# Patient Record
Sex: FEMALE | Race: WHITE | NOT HISPANIC OR LATINO | URBAN - METROPOLITAN AREA
[De-identification: names, ages, dates, MRNs, and addresses within clinical notes are randomized per-mention and may not be internally consistent; named-entity substitution may affect disease eponyms.]

---

## 2023-08-01 ENCOUNTER — NURSING HOME VISIT (OUTPATIENT)
Dept: POST ACUTE CARE | Facility: EXTERNAL LOCATION | Age: 88
End: 2023-08-01
Payer: MEDICARE

## 2023-08-01 VITALS
RESPIRATION RATE: 18 BRPM | TEMPERATURE: 97.2 F | HEART RATE: 72 BPM | SYSTOLIC BLOOD PRESSURE: 118 MMHG | DIASTOLIC BLOOD PRESSURE: 70 MMHG | OXYGEN SATURATION: 97 %

## 2023-08-01 DIAGNOSIS — S72.141D CLOSED DISPLACED INTERTROCHANTERIC FRACTURE OF RIGHT FEMUR WITH ROUTINE HEALING, SUBSEQUENT ENCOUNTER: Primary | ICD-10-CM

## 2023-08-01 DIAGNOSIS — E03.9 HYPOTHYROIDISM, UNSPECIFIED TYPE: ICD-10-CM

## 2023-08-01 DIAGNOSIS — I10 PRIMARY HYPERTENSION: ICD-10-CM

## 2023-08-01 DIAGNOSIS — D62 ACUTE BLOOD LOSS ANEMIA: ICD-10-CM

## 2023-08-01 DIAGNOSIS — F03.C0 SEVERE DEMENTIA, UNSPECIFIED DEMENTIA TYPE, UNSPECIFIED WHETHER BEHAVIORAL, PSYCHOTIC, OR MOOD DISTURBANCE OR ANXIETY (MULTI): ICD-10-CM

## 2023-08-01 PROBLEM — F03.90 DEMENTIA (MULTI): Status: ACTIVE | Noted: 2023-08-01

## 2023-08-01 PROBLEM — I48.91 ATRIAL FIBRILLATION (MULTI): Status: ACTIVE | Noted: 2023-08-01

## 2023-08-01 PROCEDURE — 99310 SBSQ NF CARE HIGH MDM 45: CPT | Performed by: NURSE PRACTITIONER

## 2023-08-01 ASSESSMENT — ENCOUNTER SYMPTOMS
PALPITATIONS: 0
FATIGUE: 0
COUGH: 0
ABDOMINAL PAIN: 0
FEVER: 0
DIARRHEA: 0
CHILLS: 0
NAUSEA: 0
SHORTNESS OF BREATH: 0
DIFFICULTY URINATING: 0
VOMITING: 0
CONSTIPATION: 0

## 2023-08-01 NOTE — PROGRESS NOTES
Subjective   Myrna Judd is a 89 y.o. female Here following hospitalization due to fall with femur fracture.    HPI  She resides in a secured memory care unit and fell, sustained a right intertrochanteric femur fracture, s/p IM nailing.  She received 1u PRBC post op.   She Is 50% weight bearing as tolerated.  She states pain controlled.  Brother and sister present in room during visit.  Myrna has dementia and is oriented times 1, she did not remember fracturing her hip or that she had surgery.     Past medical hx includes HTN, hypothyroid.     Labs:   7/28  WBC: 9.3  Hgb: 7.8  Hct: 24.7  Platelet: 213    Na: 144   K: 3.6  Cl: 109  Co2: 26  BUN: 19  Creatinine: 0.53  GFR:  86    MEDS:  Tylenol routine  Colace  Enoxaparin through 8/23  Lasix  Iron  Levothyroxine  MVI  Sertraline  Vitamin B12  Vitamin D      Review of Systems   Constitutional:  Negative for chills, fatigue and fever.   Respiratory:  Negative for cough and shortness of breath.    Cardiovascular:  Negative for chest pain and palpitations.   Gastrointestinal:  Negative for abdominal pain, constipation, diarrhea, nausea and vomiting.   Genitourinary:  Negative for difficulty urinating.       Objective   /70   Pulse 72   Temp 36.2 °C (97.2 °F)   Resp 18   SpO2 97%     Physical Exam  Constitutional:       General: She is not in acute distress.     Appearance: Normal appearance.   HENT:      Head: Normocephalic and atraumatic.   Eyes:      Conjunctiva/sclera: Conjunctivae normal.   Cardiovascular:      Rate and Rhythm: Normal rate and regular rhythm.   Pulmonary:      Effort: Pulmonary effort is normal. No respiratory distress.      Breath sounds: Normal breath sounds.   Abdominal:      General: Bowel sounds are normal. There is no distension.      Palpations: Abdomen is soft.      Tenderness: There is no abdominal tenderness.   Musculoskeletal:      Right lower leg: No edema.      Left lower leg: No edema.      Comments: Right hip incision,  able to visualize distal incision, SOHA, staples intact,  Per staff she also has a proximal incision unable to visualize as she is sitting up in chair.    Skin:     General: Skin is warm and dry.   Neurological:      General: No focal deficit present.      Mental Status: She is alert.      Comments: Oriented times 1   Psychiatric:         Mood and Affect: Mood normal.         Behavior: Behavior normal.         Assessment/Plan   Problem List Items Addressed This Visit       Closed intertrochanteric fracture of right femur with routine healing - Primary     S/p IM nailing 7/25.  She is 50% weight bearing as tolerated, due to her cognitive deficts she likely can not maintain weight bearing restrictions with attempts to ambulate.    continue with therapy as able.  Follow up with orthopedics as scheduled.           Acute blood loss anemia     Required 1u prbc post op and started on ferrous sulfate           Dementia (CMS/HCC)     She is oriented to person.           Hypothyroid     On levothyroxine, continue with current medical management           Primary hypertension     Nit currently on meds, continue to monitor and initiate meds based on clinical course.         labs/meds/orders reviewed  staff to monitor and notify for any changes.  Hospital records reviewed.  continue with therapy as able, 50% weight bearing to RLE  Follow up with orthopedics as scheduled.  Time for coordination of care was greater than 35 minutes with hospital chart review, visit and exam, discussion of treatment plan with patient and also discussion of case with staff.

## 2023-08-01 NOTE — LETTER
Patient: Myrna Judd  : 1929    Encounter Date: 2023    Subjective  Myrna Judd is a 89 y.o. female Here following hospitalization due to fall with femur fracture.    HPI  She resides in a secured memory care unit and fell, sustained a right intertrochanteric femur fracture, s/p IM nailing.  She received 1u PRBC post op.   She Is 50% weight bearing as tolerated.  She states pain controlled.  Brother and sister present in room during visit.  Myrna has dementia and is oriented times 1, she did not remember fracturing her hip or that she had surgery.     Past medical hx includes HTN, hypothyroid.     Labs:     WBC: 9.3  Hgb: 7.8  Hct: 24.7  Platelet: 213    Na: 144   K: 3.6  Cl: 109  Co2: 26  BUN: 19  Creatinine: 0.53  GFR:  86    MEDS:  Tylenol routine  Colace  Enoxaparin through   Lasix  Iron  Levothyroxine  MVI  Sertraline  Vitamin B12  Vitamin D      Review of Systems   Constitutional:  Negative for chills, fatigue and fever.   Respiratory:  Negative for cough and shortness of breath.    Cardiovascular:  Negative for chest pain and palpitations.   Gastrointestinal:  Negative for abdominal pain, constipation, diarrhea, nausea and vomiting.   Genitourinary:  Negative for difficulty urinating.       Objective  /70   Pulse 72   Temp 36.2 °C (97.2 °F)   Resp 18   SpO2 97%     Physical Exam  Constitutional:       General: She is not in acute distress.     Appearance: Normal appearance.   HENT:      Head: Normocephalic and atraumatic.   Eyes:      Conjunctiva/sclera: Conjunctivae normal.   Cardiovascular:      Rate and Rhythm: Normal rate and regular rhythm.   Pulmonary:      Effort: Pulmonary effort is normal. No respiratory distress.      Breath sounds: Normal breath sounds.   Abdominal:      General: Bowel sounds are normal. There is no distension.      Palpations: Abdomen is soft.      Tenderness: There is no abdominal tenderness.   Musculoskeletal:      Right lower leg: No  edema.      Left lower leg: No edema.      Comments: Right hip incision, able to visualize distal incision, SOHA, staples intact,  Per staff she also has a proximal incision unable to visualize as she is sitting up in chair.    Skin:     General: Skin is warm and dry.   Neurological:      General: No focal deficit present.      Mental Status: She is alert.      Comments: Oriented times 1   Psychiatric:         Mood and Affect: Mood normal.         Behavior: Behavior normal.         Assessment/Plan  Problem List Items Addressed This Visit       Closed intertrochanteric fracture of right femur with routine healing - Primary     S/p IM nailing 7/25.  She is 50% weight bearing as tolerated, due to her cognitive deficts she likely can not maintain weight bearing restrictions with attempts to ambulate.    continue with therapy as able.  Follow up with orthopedics as scheduled.           Acute blood loss anemia     Required 1u prbc post op and started on ferrous sulfate           Dementia (CMS/HCC)     She is oriented to person.           Hypothyroid     On levothyroxine, continue with current medical management           Primary hypertension     Nit currently on meds, continue to monitor and initiate meds based on clinical course.         labs/meds/orders reviewed  staff to monitor and notify for any changes.  Hospital records reviewed.  continue with therapy as able, 50% weight bearing to RLE  Follow up with orthopedics as scheduled.  Time for coordination of care was greater than 35 minutes with hospital chart review, visit and exam, discussion of treatment plan with patient and also discussion of case with staff.      Electronically Signed By: GO Thomason   8/1/23  4:12 PM

## 2023-08-01 NOTE — ASSESSMENT & PLAN NOTE
S/p IM nailing 7/25.  She is 50% weight bearing as tolerated, due to her cognitive deficts she likely can not maintain weight bearing restrictions with attempts to ambulate.    continue with therapy as able.  Follow up with orthopedics as scheduled.

## 2023-08-04 ENCOUNTER — NURSING HOME VISIT (OUTPATIENT)
Dept: POST ACUTE CARE | Facility: EXTERNAL LOCATION | Age: 88
End: 2023-08-04
Payer: MEDICARE

## 2023-08-04 VITALS
SYSTOLIC BLOOD PRESSURE: 124 MMHG | OXYGEN SATURATION: 97 % | RESPIRATION RATE: 18 BRPM | DIASTOLIC BLOOD PRESSURE: 76 MMHG | TEMPERATURE: 98.1 F | HEART RATE: 78 BPM

## 2023-08-04 DIAGNOSIS — I10 PRIMARY HYPERTENSION: ICD-10-CM

## 2023-08-04 DIAGNOSIS — I48.91 ATRIAL FIBRILLATION, UNSPECIFIED TYPE (MULTI): ICD-10-CM

## 2023-08-04 DIAGNOSIS — D62 ACUTE BLOOD LOSS ANEMIA: ICD-10-CM

## 2023-08-04 DIAGNOSIS — S72.141D CLOSED DISPLACED INTERTROCHANTERIC FRACTURE OF RIGHT FEMUR WITH ROUTINE HEALING, SUBSEQUENT ENCOUNTER: Primary | ICD-10-CM

## 2023-08-04 DIAGNOSIS — F03.C0 SEVERE DEMENTIA, UNSPECIFIED DEMENTIA TYPE, UNSPECIFIED WHETHER BEHAVIORAL, PSYCHOTIC, OR MOOD DISTURBANCE OR ANXIETY (MULTI): ICD-10-CM

## 2023-08-04 PROCEDURE — 99306 1ST NF CARE HIGH MDM 50: CPT | Performed by: INTERNAL MEDICINE

## 2023-08-04 NOTE — PROGRESS NOTES
"Subjective   Patient ID: Myrna Judd is a 89 y.o. female who is acute skilled care being seen and evaluated for multiple medical problems.    HPI   Myrna Judd is a 89 y.o. female Here following hospitalization due to fall with femur fracture.    HPI  She resides in a secured memory care unit and fell, sustained a right intertrochanteric femur fracture, s/p IM nailing.  She received 1u PRBC post op.   She Is 50% weight bearing as tolerated.  She states pain controlled.  Brother and sister present in room during visit.  Myrna has dementia and is oriented times 1, she did not remember fracturing her hip or that she had surgery.     Past medical hx includes HTN, hypothyroid.    The patient is resting in her wheelchair with family present.  She is in acute pain and reports that the Tylenol that she is receiving is inadequate for pain management.  Family reports that the patient became \"loopy\" when taking opiates in the hospital.  Per the family the patient has likely not had exposure to the opiate agonists medication  tramadol.     Labs:   7/28  WBC: 9.3  Hgb: 7.8  Hct: 24.7  Platelet: 213     Na: 144   K: 3.6  Cl: 109  Co2: 26  BUN: 19  Creatinine: 0.53  GFR:  86     MEDS:  Tylenol routine  Colace  Enoxaparin through 8/23  Lasix  Iron  Levothyroxine  MVI  Sertraline  Vitamin B12  Vitamin D     Review of Systems   Constitutional:  Negative for chills, fatigue and fever.   Respiratory:  Negative for cough and shortness of breath.    Cardiovascular:  Negative for chest pain and palpitations.   Gastrointestinal:  Negative for abdominal pain, constipation, diarrhea, nausea and vomiting.   Genitourinary:  Negative for difficulty urinating.       Objective   /76   Pulse 78   Temp 36.7 °C (98.1 °F)   Resp 18   SpO2 97%     Physical Exam  Constitutional:       General: She is not in acute distress.     Appearance: Normal appearance.   HENT:      Head: Normocephalic and atraumatic.      Mouth/Throat:      " Mouth: Mucous membranes are moist.   Eyes:      Conjunctiva/sclera: Conjunctivae normal.   Cardiovascular:      Rate and Rhythm: Normal rate and regular rhythm.   Pulmonary:      Effort: Pulmonary effort is normal. No respiratory distress.      Breath sounds: Normal breath sounds.   Abdominal:      General: Bowel sounds are normal. There is no distension.      Palpations: Abdomen is soft.      Tenderness: There is no abdominal tenderness.   Musculoskeletal:      Right lower leg: No edema.      Left lower leg: No edema.      Comments: Right hip incision, able to visualize distal incision, SOHA, staples intact,  Per staff she also has a proximal incision unable to visualize as she is sitting up in chair.    Skin:     General: Skin is warm and dry.   Neurological:      General: No focal deficit present.      Mental Status: She is alert.      Comments: Oriented times 1   Psychiatric:         Mood and Affect: Mood normal.         Behavior: Behavior normal.         Assessment/Plan   Problem List Items Addressed This Visit       Atrial fibrillation (CMS/HCC)    Closed intertrochanteric fracture of right femur with routine healing - Primary    Acute blood loss anemia    Dementia (CMS/HCC)    Primary hypertension       A.  We will continue with rehabilitative restorative and supportive care as patient tolerates    B.  We will begin the patient on low-dose as needed tramadol for analgesic therapy in hopes that she will be able to participate in physical therapy to a better degree without so much discomfort.    C.  The patient left close outpatient follow-up with her orthopedic surgeon to monitor healing of the fixated femur fracture    D.  Laboratory examinations will be drawn on an ongoing as-needed basis    E.  Disposition will be pending response to rehabilitation overall assessment of patient safety awareness however the patient may at some point be ready for discharge back to the memory care unit at an assisted living  facility from which she came.    F.  The patient's prognosis is guarded.

## 2023-08-04 NOTE — LETTER
"Patient: Myrna Judd  : 1929    Encounter Date: 2023    Subjective  Patient ID: Myrna Judd is a 89 y.o. female who is acute skilled care being seen and evaluated for multiple medical problems.    HPI   Myrna Judd is a 89 y.o. female Here following hospitalization due to fall with femur fracture.    HPI  She resides in a secured memory care unit and fell, sustained a right intertrochanteric femur fracture, s/p IM nailing.  She received 1u PRBC post op.   She Is 50% weight bearing as tolerated.  She states pain controlled.  Brother and sister present in room during visit.  Myrna has dementia and is oriented times 1, she did not remember fracturing her hip or that she had surgery.     Past medical hx includes HTN, hypothyroid.    The patient is resting in her wheelchair with family present.  She is in acute pain and reports that the Tylenol that she is receiving is inadequate for pain management.  Family reports that the patient became \"loopy\" when taking opiates in the hospital.  Per the family the patient has likely not had exposure to the opiate agonists medication  tramadol.     Labs:     WBC: 9.3  Hgb: 7.8  Hct: 24.7  Platelet: 213     Na: 144   K: 3.6  Cl: 109  Co2: 26  BUN: 19  Creatinine: 0.53  GFR:  86     MEDS:  Tylenol routine  Colace  Enoxaparin through   Lasix  Iron  Levothyroxine  MVI  Sertraline  Vitamin B12  Vitamin D     Review of Systems   Constitutional:  Negative for chills, fatigue and fever.   Respiratory:  Negative for cough and shortness of breath.    Cardiovascular:  Negative for chest pain and palpitations.   Gastrointestinal:  Negative for abdominal pain, constipation, diarrhea, nausea and vomiting.   Genitourinary:  Negative for difficulty urinating.       Objective  /76   Pulse 78   Temp 36.7 °C (98.1 °F)   Resp 18   SpO2 97%     Physical Exam  Constitutional:       General: She is not in acute distress.     Appearance: Normal appearance.   HENT: "      Head: Normocephalic and atraumatic.      Mouth/Throat:      Mouth: Mucous membranes are moist.   Eyes:      Conjunctiva/sclera: Conjunctivae normal.   Cardiovascular:      Rate and Rhythm: Normal rate and regular rhythm.   Pulmonary:      Effort: Pulmonary effort is normal. No respiratory distress.      Breath sounds: Normal breath sounds.   Abdominal:      General: Bowel sounds are normal. There is no distension.      Palpations: Abdomen is soft.      Tenderness: There is no abdominal tenderness.   Musculoskeletal:      Right lower leg: No edema.      Left lower leg: No edema.      Comments: Right hip incision, able to visualize distal incision, SOHA, staples intact,  Per staff she also has a proximal incision unable to visualize as she is sitting up in chair.    Skin:     General: Skin is warm and dry.   Neurological:      General: No focal deficit present.      Mental Status: She is alert.      Comments: Oriented times 1   Psychiatric:         Mood and Affect: Mood normal.         Behavior: Behavior normal.         Assessment/Plan  Problem List Items Addressed This Visit       Atrial fibrillation (CMS/HCC)    Closed intertrochanteric fracture of right femur with routine healing - Primary    Acute blood loss anemia    Dementia (CMS/formerly [facility])    Primary hypertension       A.  We will continue with rehabilitative restorative and supportive care as patient tolerates    B.  We will begin the patient on low-dose as needed tramadol for analgesic therapy in hopes that she will be able to participate in physical therapy to a better degree without so much discomfort.    C.  The patient left close outpatient follow-up with her orthopedic surgeon to monitor healing of the fixated femur fracture    D.  Laboratory examinations will be drawn on an ongoing as-needed basis    E.  Disposition will be pending response to rehabilitation overall assessment of patient safety awareness however the patient may at some point be ready for  discharge back to the memory care unit at an assisted living facility from which she came.    F.  The patient's prognosis is guarded.      Electronically Signed By: Michael Luz MD   8/8/23 10:19 PM

## 2023-08-07 ENCOUNTER — NURSING HOME VISIT (OUTPATIENT)
Dept: POST ACUTE CARE | Facility: EXTERNAL LOCATION | Age: 88
End: 2023-08-07
Payer: MEDICARE

## 2023-08-07 DIAGNOSIS — I48.91 ATRIAL FIBRILLATION, UNSPECIFIED TYPE (MULTI): ICD-10-CM

## 2023-08-07 DIAGNOSIS — D62 ACUTE BLOOD LOSS ANEMIA: ICD-10-CM

## 2023-08-07 DIAGNOSIS — S72.141D CLOSED DISPLACED INTERTROCHANTERIC FRACTURE OF RIGHT FEMUR WITH ROUTINE HEALING, SUBSEQUENT ENCOUNTER: Primary | ICD-10-CM

## 2023-08-07 DIAGNOSIS — I10 PRIMARY HYPERTENSION: ICD-10-CM

## 2023-08-07 DIAGNOSIS — F03.C0 SEVERE DEMENTIA, UNSPECIFIED DEMENTIA TYPE, UNSPECIFIED WHETHER BEHAVIORAL, PSYCHOTIC, OR MOOD DISTURBANCE OR ANXIETY (MULTI): ICD-10-CM

## 2023-08-07 PROCEDURE — 99309 SBSQ NF CARE MODERATE MDM 30: CPT | Performed by: INTERNAL MEDICINE

## 2023-08-07 NOTE — LETTER
Patient: Myrna Judd  : 1929    Encounter Date: 2023    Subjective  Patient ID: Myrna Judd is a 89 y.o. female who is acute skilled care being seen and evaluated for multiple medical problems.    HPI   This 89-year-old female patient reports that the pain medication she is taking seems to help.  Daughter is visiting this afternoon.  She has had a recent right hip open reduction internal fixation.  The patient is having some urinary symptoms and therefore we are waiting the results of a urine analysis to look for urinary tract infection.  Per nursing the patient is having poor oral intake and therefore there is concern about her ability to keep yourself hydrated.    Current Ravi medication:  Lovenox  Iron  Tramadol    Laboratory examinations from hospitalization have been reviewed.        Review of Systems   Constitutional:  Negative for chills, fatigue and fever.   Respiratory:  Negative for cough and shortness of breath.    Cardiovascular:  Negative for chest pain and palpitations.   Gastrointestinal:  Negative for abdominal pain, constipation, diarrhea, nausea and vomiting.   Genitourinary:  Negative for difficulty urinating.       Objective  /60   Pulse 78   Temp 36.4 °C (97.5 °F)   Resp 18   SpO2 97%     Physical Exam  Constitutional:       General: She is not in acute distress.     Appearance: Normal appearance.   HENT:      Head: Normocephalic and atraumatic.      Mouth/Throat:      Mouth: Mucous membranes are moist.   Eyes:      Conjunctiva/sclera: Conjunctivae normal.   Cardiovascular:      Rate and Rhythm: Normal rate and regular rhythm.   Pulmonary:      Effort: Pulmonary effort is normal. No respiratory distress.      Breath sounds: Normal breath sounds.   Abdominal:      General: Bowel sounds are normal. There is no distension.      Palpations: Abdomen is soft.      Tenderness: There is no abdominal tenderness.   Musculoskeletal:      Right lower leg: No edema.      Left  lower leg: No edema.      Comments: Right hip incision, able to visualize distal incision, SOHA, staples intact,  Per staff she also has a proximal incision unable to visualize as she is sitting up in chair.    Skin:     General: Skin is warm and dry.   Neurological:      General: No focal deficit present.      Mental Status: She is alert.      Comments: Oriented times 1   Psychiatric:         Mood and Affect: Mood normal.         Behavior: Behavior normal.         Assessment/Plan  Problem List Items Addressed This Visit       Atrial fibrillation (CMS/formerly [facility])    Closed intertrochanteric fracture of right femur with routine healing - Primary    Acute blood loss anemia    Dementia (CMS/formerly [facility])    Primary hypertension     8.  We will continue with rehabilitative restorative and supportive care as patient tolerates    B.  Will await urinalysis for her sample and treat urinary tract infection if it is there.    C.  Of asked for calorie and fluid counts to monitor whether the patient will be able to maintain her self    D.  Continue with tramadol as needed for pain as she appears to tolerate it fairly well    E.  The patient's prognosis is guarded.        Electronically Signed By: Michael Luz MD   8/20/23  7:43 PM

## 2023-08-08 VITALS
DIASTOLIC BLOOD PRESSURE: 60 MMHG | RESPIRATION RATE: 18 BRPM | OXYGEN SATURATION: 97 % | SYSTOLIC BLOOD PRESSURE: 136 MMHG | HEART RATE: 78 BPM | TEMPERATURE: 97.5 F

## 2023-08-08 ASSESSMENT — ENCOUNTER SYMPTOMS
NAUSEA: 0
DIARRHEA: 0
DIFFICULTY URINATING: 0
PALPITATIONS: 0
VOMITING: 0
CONSTIPATION: 0
ABDOMINAL PAIN: 0
FATIGUE: 0
COUGH: 0
FEVER: 0
CHILLS: 0
SHORTNESS OF BREATH: 0

## 2023-08-08 NOTE — PROGRESS NOTES
Subjective   Patient ID: Myrna Judd is a 89 y.o. female who is acute skilled care being seen and evaluated for multiple medical problems.    HPI   This 89-year-old female patient reports that the pain medication she is taking seems to help.  Daughter is visiting this afternoon.  She has had a recent right hip open reduction internal fixation.  The patient is having some urinary symptoms and therefore we are waiting the results of a urine analysis to look for urinary tract infection.  Per nursing the patient is having poor oral intake and therefore there is concern about her ability to keep yourself hydrated.    Current Ravi medication:  Lovenox  Iron  Tramadol    Laboratory examinations from hospitalization have been reviewed.        Review of Systems   Constitutional:  Negative for chills, fatigue and fever.   Respiratory:  Negative for cough and shortness of breath.    Cardiovascular:  Negative for chest pain and palpitations.   Gastrointestinal:  Negative for abdominal pain, constipation, diarrhea, nausea and vomiting.   Genitourinary:  Negative for difficulty urinating.       Objective   /60   Pulse 78   Temp 36.4 °C (97.5 °F)   Resp 18   SpO2 97%     Physical Exam  Constitutional:       General: She is not in acute distress.     Appearance: Normal appearance.   HENT:      Head: Normocephalic and atraumatic.      Mouth/Throat:      Mouth: Mucous membranes are moist.   Eyes:      Conjunctiva/sclera: Conjunctivae normal.   Cardiovascular:      Rate and Rhythm: Normal rate and regular rhythm.   Pulmonary:      Effort: Pulmonary effort is normal. No respiratory distress.      Breath sounds: Normal breath sounds.   Abdominal:      General: Bowel sounds are normal. There is no distension.      Palpations: Abdomen is soft.      Tenderness: There is no abdominal tenderness.   Musculoskeletal:      Right lower leg: No edema.      Left lower leg: No edema.      Comments: Right hip incision, able to  visualize distal incision, SOHA, staples intact,  Per staff she also has a proximal incision unable to visualize as she is sitting up in chair.    Skin:     General: Skin is warm and dry.   Neurological:      General: No focal deficit present.      Mental Status: She is alert.      Comments: Oriented times 1   Psychiatric:         Mood and Affect: Mood normal.         Behavior: Behavior normal.         Assessment/Plan   Problem List Items Addressed This Visit       Atrial fibrillation (CMS/HCC)    Closed intertrochanteric fracture of right femur with routine healing - Primary    Acute blood loss anemia    Dementia (CMS/[facility])    Primary hypertension     8.  We will continue with rehabilitative restorative and supportive care as patient tolerates    B.  Will await urinalysis for her sample and treat urinary tract infection if it is there.    C.  Of asked for calorie and fluid counts to monitor whether the patient will be able to maintain her self    D.  Continue with tramadol as needed for pain as she appears to tolerate it fairly well    E.  The patient's prognosis is guarded.

## 2023-08-10 ENCOUNTER — NURSING HOME VISIT (OUTPATIENT)
Dept: POST ACUTE CARE | Facility: EXTERNAL LOCATION | Age: 88
End: 2023-08-10
Payer: MEDICARE

## 2023-08-10 VITALS
TEMPERATURE: 98.1 F | DIASTOLIC BLOOD PRESSURE: 74 MMHG | RESPIRATION RATE: 18 BRPM | HEART RATE: 89 BPM | OXYGEN SATURATION: 97 % | SYSTOLIC BLOOD PRESSURE: 122 MMHG

## 2023-08-10 DIAGNOSIS — I48.91 ATRIAL FIBRILLATION, UNSPECIFIED TYPE (MULTI): ICD-10-CM

## 2023-08-10 DIAGNOSIS — S72.141D CLOSED DISPLACED INTERTROCHANTERIC FRACTURE OF RIGHT FEMUR WITH ROUTINE HEALING, SUBSEQUENT ENCOUNTER: Primary | ICD-10-CM

## 2023-08-10 DIAGNOSIS — F03.C0 SEVERE DEMENTIA, UNSPECIFIED DEMENTIA TYPE, UNSPECIFIED WHETHER BEHAVIORAL, PSYCHOTIC, OR MOOD DISTURBANCE OR ANXIETY (MULTI): ICD-10-CM

## 2023-08-10 DIAGNOSIS — D62 ACUTE BLOOD LOSS ANEMIA: ICD-10-CM

## 2023-08-10 DIAGNOSIS — I10 PRIMARY HYPERTENSION: ICD-10-CM

## 2023-08-10 PROCEDURE — 99309 SBSQ NF CARE MODERATE MDM 30: CPT | Performed by: NURSE PRACTITIONER

## 2023-08-10 ASSESSMENT — ENCOUNTER SYMPTOMS
CONSTIPATION: 0
FEVER: 0
DIFFICULTY URINATING: 0
DIARRHEA: 0
VOMITING: 0
PALPITATIONS: 0
CHILLS: 0
ABDOMINAL PAIN: 0
FATIGUE: 0
SHORTNESS OF BREATH: 0
COUGH: 0
NAUSEA: 0

## 2023-08-10 NOTE — LETTER
Patient: Myrna Judd  : 1929    Encounter Date: 08/10/2023    Subjective  Myrna Judd is a 89 y.o. female Here for weekly skilled visit.   HPI    Health problems reviewed.  She was started on augmentin yesterday due to uti.  Participating in therapy as able, limited by weiight bearing restructions and ognitive deficits.   Eating and drinking fair per staff, calorie and fluid count was ordered, no results available.   Denies any complaints of pain.  Daughter present during visit.  No concerns per staff.        Review of Systems   Constitutional:  Negative for chills, fatigue and fever.   Respiratory:  Negative for cough and shortness of breath.    Cardiovascular:  Negative for chest pain and palpitations.   Gastrointestinal:  Negative for abdominal pain, constipation, diarrhea, nausea and vomiting.   Genitourinary:  Negative for difficulty urinating.       Objective  /74   Pulse 89   Temp 36.7 °C (98.1 °F)   Resp 18   SpO2 97%     Physical Exam  Constitutional:       General: She is not in acute distress.     Appearance: Normal appearance.   HENT:      Head: Normocephalic and atraumatic.   Eyes:      Conjunctiva/sclera: Conjunctivae normal.   Cardiovascular:      Rate and Rhythm: Normal rate and regular rhythm.   Pulmonary:      Effort: Pulmonary effort is normal. No respiratory distress.      Breath sounds: Normal breath sounds.   Abdominal:      General: Bowel sounds are normal. There is no distension.      Palpations: Abdomen is soft.      Tenderness: There is no abdominal tenderness.   Genitourinary:     Comments: Limon draining clear yellow  Musculoskeletal:      Right lower leg: No edema.      Left lower leg: No edema.      Comments: Right hip incision, able to visualize distal incision, SOHA, staples intact,  Per staff she also has a proximal incision unable to visualize as she is sitting up in chair.    Skin:     General: Skin is warm and dry.   Neurological:      General: No focal  deficit present.      Mental Status: She is alert.      Comments: Oriented times 1   Psychiatric:         Mood and Affect: Mood normal.         Behavior: Behavior normal.         Assessment/Plan  Problem List Items Addressed This Visit       Atrial fibrillation (CMS/HCC)     Not on ac.  Rate controlled.           Closed intertrochanteric fracture of right femur with routine healing - Primary     S/p IM nailing 7/25.  She is 50% weight bearing as tolerated, due to her cognitive deficts she likely can not maintain weight bearing restrictions with attempts to ambulate.    continue with therapy as able.  Follow up with orthopedics as scheduled.           Acute blood loss anemia     Required 1u prbc post op and started on ferrous sulfate.  Repeat hgb 9.9.             Dementia (CMS/HCC)     She is oriented to person.           Primary hypertension     Not currently on meds, continue to monitor and initiate meds based on clinical course.         labs/meds/orders reviewed  staff to monitor and notify for any changes.  continue with therapy as able, 50% weight bearing to RLE  Follow up with orthopedics as scheduled.  Started on augmentin last night due to uti.  Limon to be removed in am, reinsert if no void in 8 hours.       Electronically Signed By: ZACHERY Thomason-JEANE   8/10/23  3:34 PM

## 2023-08-10 NOTE — PROGRESS NOTES
Subjective   Myrna Judd is a 89 y.o. female Here for weekly skilled visit.   HPI    Health problems reviewed.  She was started on augmentin yesterday due to uti.  Participating in therapy as able, limited by weiight bearing restructions and ognitive deficits.   Eating and drinking fair per staff, calorie and fluid count was ordered, no results available.   Denies any complaints of pain.  Daughter present during visit.  No concerns per staff.        Review of Systems   Constitutional:  Negative for chills, fatigue and fever.   Respiratory:  Negative for cough and shortness of breath.    Cardiovascular:  Negative for chest pain and palpitations.   Gastrointestinal:  Negative for abdominal pain, constipation, diarrhea, nausea and vomiting.   Genitourinary:  Negative for difficulty urinating.       Objective   /74   Pulse 89   Temp 36.7 °C (98.1 °F)   Resp 18   SpO2 97%     Physical Exam  Constitutional:       General: She is not in acute distress.     Appearance: Normal appearance.   HENT:      Head: Normocephalic and atraumatic.   Eyes:      Conjunctiva/sclera: Conjunctivae normal.   Cardiovascular:      Rate and Rhythm: Normal rate and regular rhythm.   Pulmonary:      Effort: Pulmonary effort is normal. No respiratory distress.      Breath sounds: Normal breath sounds.   Abdominal:      General: Bowel sounds are normal. There is no distension.      Palpations: Abdomen is soft.      Tenderness: There is no abdominal tenderness.   Genitourinary:     Comments: Limon draining clear yellow  Musculoskeletal:      Right lower leg: No edema.      Left lower leg: No edema.      Comments: Right hip incision, able to visualize distal incision, SOHA, staples intact,  Per staff she also has a proximal incision unable to visualize as she is sitting up in chair.    Skin:     General: Skin is warm and dry.   Neurological:      General: No focal deficit present.      Mental Status: She is alert.      Comments: Oriented  times 1   Psychiatric:         Mood and Affect: Mood normal.         Behavior: Behavior normal.         Assessment/Plan   Problem List Items Addressed This Visit       Atrial fibrillation (CMS/HCC)     Not on ac.  Rate controlled.           Closed intertrochanteric fracture of right femur with routine healing - Primary     S/p IM nailing 7/25.  She is 50% weight bearing as tolerated, due to her cognitive deficts she likely can not maintain weight bearing restrictions with attempts to ambulate.    continue with therapy as able.  Follow up with orthopedics as scheduled.           Acute blood loss anemia     Required 1u prbc post op and started on ferrous sulfate.  Repeat hgb 9.9.             Dementia (CMS/HCC)     She is oriented to person.           Primary hypertension     Not currently on meds, continue to monitor and initiate meds based on clinical course.         labs/meds/orders reviewed  staff to monitor and notify for any changes.  continue with therapy as able, 50% weight bearing to RLE  Follow up with orthopedics as scheduled.  Started on augmentin last night due to uti.  Ilmon to be removed in am, reinsert if no void in 8 hours.

## 2023-08-15 ENCOUNTER — NURSING HOME VISIT (OUTPATIENT)
Dept: POST ACUTE CARE | Facility: EXTERNAL LOCATION | Age: 88
End: 2023-08-15
Payer: MEDICARE

## 2023-08-15 VITALS
DIASTOLIC BLOOD PRESSURE: 76 MMHG | SYSTOLIC BLOOD PRESSURE: 123 MMHG | TEMPERATURE: 99 F | RESPIRATION RATE: 18 BRPM | HEART RATE: 99 BPM | OXYGEN SATURATION: 98 %

## 2023-08-15 DIAGNOSIS — F03.C0 SEVERE DEMENTIA, UNSPECIFIED DEMENTIA TYPE, UNSPECIFIED WHETHER BEHAVIORAL, PSYCHOTIC, OR MOOD DISTURBANCE OR ANXIETY (MULTI): ICD-10-CM

## 2023-08-15 DIAGNOSIS — I10 PRIMARY HYPERTENSION: ICD-10-CM

## 2023-08-15 DIAGNOSIS — S72.141D CLOSED DISPLACED INTERTROCHANTERIC FRACTURE OF RIGHT FEMUR WITH ROUTINE HEALING, SUBSEQUENT ENCOUNTER: Primary | ICD-10-CM

## 2023-08-15 DIAGNOSIS — N39.0 URINARY TRACT INFECTION ASSOCIATED WITH INDWELLING URETHRAL CATHETER, SUBSEQUENT ENCOUNTER: ICD-10-CM

## 2023-08-15 DIAGNOSIS — I48.91 ATRIAL FIBRILLATION, UNSPECIFIED TYPE (MULTI): ICD-10-CM

## 2023-08-15 DIAGNOSIS — T83.511D URINARY TRACT INFECTION ASSOCIATED WITH INDWELLING URETHRAL CATHETER, SUBSEQUENT ENCOUNTER: ICD-10-CM

## 2023-08-15 PROBLEM — T83.511A URINARY TRACT INFECTION ASSOCIATED WITH INDWELLING URETHRAL CATHETER (CMS-HCC): Status: ACTIVE | Noted: 2023-08-15

## 2023-08-15 PROCEDURE — 99309 SBSQ NF CARE MODERATE MDM 30: CPT | Performed by: NURSE PRACTITIONER

## 2023-08-15 ASSESSMENT — ENCOUNTER SYMPTOMS
DIFFICULTY URINATING: 0
FEVER: 0
ABDOMINAL PAIN: 0
CHILLS: 0
NAUSEA: 0
VOMITING: 0
SHORTNESS OF BREATH: 0
FATIGUE: 0
DIARRHEA: 0
COUGH: 0
CONSTIPATION: 0
PALPITATIONS: 0

## 2023-08-15 NOTE — PROGRESS NOTES
Subjective   Myrna Judd is a 89 y.o. female Here for weekly skilled visit.   HPI    Health problems reviewed.    Participating in therapy as able, limited by weiight bearing restructions and cognitive deficits.   Eating and drinking fair per staff, calorie and fluid count was ordered, no results available.   Denies any complaints of pain.  She completed course of augmentin for klebsiella uti.  Corbett was ordered to be removed 8/11 and reinsert if no void in 8 hours.  It is unclear at this time if this completed, corbett is intact at present, no documentation to support if this was removed and resinserted.   No concerns per staff.        Review of Systems   Constitutional:  Negative for chills, fatigue and fever.   Respiratory:  Negative for cough and shortness of breath.    Cardiovascular:  Negative for chest pain and palpitations.   Gastrointestinal:  Negative for abdominal pain, constipation, diarrhea, nausea and vomiting.   Genitourinary:  Negative for difficulty urinating.       Objective   /76   Pulse 99   Temp 37.2 °C (99 °F)   Resp 18   SpO2 98%     Physical Exam  Constitutional:       General: She is not in acute distress.     Appearance: Normal appearance.   HENT:      Head: Normocephalic and atraumatic.   Eyes:      Conjunctiva/sclera: Conjunctivae normal.   Cardiovascular:      Rate and Rhythm: Normal rate and regular rhythm.   Pulmonary:      Effort: Pulmonary effort is normal. No respiratory distress.      Breath sounds: Normal breath sounds.   Abdominal:      General: Bowel sounds are normal. There is no distension.      Palpations: Abdomen is soft.      Tenderness: There is no abdominal tenderness.   Genitourinary:     Comments: Corbett draining clear yellow  Musculoskeletal:      Right lower leg: No edema.      Left lower leg: No edema.      Comments: Right hip incision, SOHA, intact.    Skin:     General: Skin is warm and dry.   Neurological:      General: No focal deficit present.       Mental Status: She is alert.      Comments: Oriented times 1   Psychiatric:         Mood and Affect: Mood normal.         Behavior: Behavior normal.         Assessment/Plan   Problem List Items Addressed This Visit       Atrial fibrillation (CMS/HCC)     Not normally on ac,currently on lovenox post op at dvt ppx dose.   Rate controlled.           Closed intertrochanteric fracture of right femur with routine healing - Primary     S/p IM nailing 7/25.  She is 50% weight bearing as tolerated, due to her cognitive deficts she likely can not maintain weight bearing restrictions with attempts to ambulate.    continue with therapy as able.  Follow up with orthopedics as scheduled.           Dementia (CMS/HCC)     She is oriented to person.           Primary hypertension     Not currently on meds, continue to monitor and initiate meds based on clinical course.          Urinary tract infection associated with indwelling urethral catheter (CMS/[facility])     Klebsiella.  She completed course of augmentin which sensitivity supports use.  Corbett was ordered to be discontinued 8/11 and reinserted if no void in 8 hours.  Corbett is currently in place, unclear if voiding trial was completed as no documentation available.  Will have staff clarify if corbett was removed and she had retention.          labs/meds/orders reviewed  staff to monitor and notify for any changes.  continue with therapy as able, 50% weight bearing to RLE  Follow up with orthopedics as scheduled.  Will have staff clarify if she  failed voiding trial or if corbett was not removed as ordered.   She is weak and frail.    Ss for discharge planning.

## 2023-08-15 NOTE — LETTER
Patient: Myrna Judd  : 1929    Encounter Date: 08/15/2023    Subjective  Myrna Judd is a 89 y.o. female Here for weekly skilled visit.   HPI    Health problems reviewed.    Participating in therapy as able, limited by weiight bearing restructions and cognitive deficits.   Eating and drinking fair per staff, calorie and fluid count was ordered, no results available.   Denies any complaints of pain.  She completed course of augmentin for klebsiella uti.  Corbett was ordered to be removed  and reinsert if no void in 8 hours.  It is unclear at this time if this completed, corbett is intact at present, no documentation to support if this was removed and resinserted.   No concerns per staff.        Review of Systems   Constitutional:  Negative for chills, fatigue and fever.   Respiratory:  Negative for cough and shortness of breath.    Cardiovascular:  Negative for chest pain and palpitations.   Gastrointestinal:  Negative for abdominal pain, constipation, diarrhea, nausea and vomiting.   Genitourinary:  Negative for difficulty urinating.       Objective  /76   Pulse 99   Temp 37.2 °C (99 °F)   Resp 18   SpO2 98%     Physical Exam  Constitutional:       General: She is not in acute distress.     Appearance: Normal appearance.   HENT:      Head: Normocephalic and atraumatic.   Eyes:      Conjunctiva/sclera: Conjunctivae normal.   Cardiovascular:      Rate and Rhythm: Normal rate and regular rhythm.   Pulmonary:      Effort: Pulmonary effort is normal. No respiratory distress.      Breath sounds: Normal breath sounds.   Abdominal:      General: Bowel sounds are normal. There is no distension.      Palpations: Abdomen is soft.      Tenderness: There is no abdominal tenderness.   Genitourinary:     Comments: Corbett draining clear yellow  Musculoskeletal:      Right lower leg: No edema.      Left lower leg: No edema.      Comments: Right hip incision, SOHA, intact.    Skin:     General: Skin is warm and  dry.   Neurological:      General: No focal deficit present.      Mental Status: She is alert.      Comments: Oriented times 1   Psychiatric:         Mood and Affect: Mood normal.         Behavior: Behavior normal.         Assessment/Plan  Problem List Items Addressed This Visit       Atrial fibrillation (CMS/HCC)     Not normally on ac,currently on lovenox post op at dvt ppx dose.   Rate controlled.           Closed intertrochanteric fracture of right femur with routine healing - Primary     S/p IM nailing 7/25.  She is 50% weight bearing as tolerated, due to her cognitive deficts she likely can not maintain weight bearing restrictions with attempts to ambulate.    continue with therapy as able.  Follow up with orthopedics as scheduled.           Dementia (CMS/HCC)     She is oriented to person.           Primary hypertension     Not currently on meds, continue to monitor and initiate meds based on clinical course.          Urinary tract infection associated with indwelling urethral catheter (CMS/HCC)     Klebsiella.  She completed course of augmentin which sensitivity supports use.  Corbett was ordered to be discontinued 8/11 and reinserted if no void in 8 hours.  Corbett is currently in place, unclear if voiding trial was completed as no documentation available.  Will have staff clarify if corbett was removed and she had retention.          labs/meds/orders reviewed  staff to monitor and notify for any changes.  continue with therapy as able, 50% weight bearing to RLE  Follow up with orthopedics as scheduled.  Will have staff clarify if she  failed voiding trial or if corbett was not removed as ordered.   She is weak and frail.    Ss for discharge planning.       Electronically Signed By: GO Thomason   8/15/23  7:00 PM

## 2023-08-15 NOTE — ASSESSMENT & PLAN NOTE
Klebsiella.  She completed course of augmentin which sensitivity supports use.  Corbett was ordered to be discontinued 8/11 and reinserted if no void in 8 hours.  Corbett is currently in place, unclear if voiding trial was completed as no documentation available.  Will have staff clarify if corbett was removed and she had retention.

## 2023-08-17 ENCOUNTER — NURSING HOME VISIT (OUTPATIENT)
Dept: POST ACUTE CARE | Facility: EXTERNAL LOCATION | Age: 88
End: 2023-08-17
Payer: MEDICARE

## 2023-08-17 VITALS
DIASTOLIC BLOOD PRESSURE: 77 MMHG | TEMPERATURE: 98.1 F | HEART RATE: 78 BPM | RESPIRATION RATE: 18 BRPM | SYSTOLIC BLOOD PRESSURE: 134 MMHG | OXYGEN SATURATION: 97 %

## 2023-08-17 DIAGNOSIS — I48.91 ATRIAL FIBRILLATION, UNSPECIFIED TYPE (MULTI): ICD-10-CM

## 2023-08-17 DIAGNOSIS — I10 PRIMARY HYPERTENSION: ICD-10-CM

## 2023-08-17 DIAGNOSIS — F03.C0 SEVERE DEMENTIA, UNSPECIFIED DEMENTIA TYPE, UNSPECIFIED WHETHER BEHAVIORAL, PSYCHOTIC, OR MOOD DISTURBANCE OR ANXIETY (MULTI): ICD-10-CM

## 2023-08-17 DIAGNOSIS — T83.511D URINARY TRACT INFECTION ASSOCIATED WITH INDWELLING URETHRAL CATHETER, SUBSEQUENT ENCOUNTER: ICD-10-CM

## 2023-08-17 DIAGNOSIS — S72.141D CLOSED DISPLACED INTERTROCHANTERIC FRACTURE OF RIGHT FEMUR WITH ROUTINE HEALING, SUBSEQUENT ENCOUNTER: Primary | ICD-10-CM

## 2023-08-17 DIAGNOSIS — N39.0 URINARY TRACT INFECTION ASSOCIATED WITH INDWELLING URETHRAL CATHETER, SUBSEQUENT ENCOUNTER: ICD-10-CM

## 2023-08-17 PROCEDURE — 99310 SBSQ NF CARE HIGH MDM 45: CPT | Performed by: NURSE PRACTITIONER

## 2023-08-17 NOTE — PROGRESS NOTES
Subjective   Myrna Judd is a 89 y.o. female Here for discharge planning.   HPI    Health problems reviewed.    Participating in therapy as able, limited by weiight bearing restructions and cognitive deficits.   Eating and drinking fair per staff.  calorie and fluid count was ordered, no results available.   Denies any complaints of pain.  She completed course of augmentin for klebsiella uti.  Limon was removed yesterday morning and per staff she is voiding without difficulty.   No concerns per staff.        Review of Systems   Constitutional:  Negative for chills, fatigue and fever.   Respiratory:  Negative for cough and shortness of breath.    Cardiovascular:  Negative for chest pain and palpitations.   Gastrointestinal:  Negative for abdominal pain, constipation, diarrhea, nausea and vomiting.   Genitourinary:  Negative for difficulty urinating.       Objective   /77   Pulse 78   Temp 36.7 °C (98.1 °F)   Resp 18   SpO2 97%     Physical Exam  Constitutional:       General: She is not in acute distress.     Appearance: Normal appearance.   HENT:      Head: Normocephalic and atraumatic.   Eyes:      Conjunctiva/sclera: Conjunctivae normal.   Cardiovascular:      Rate and Rhythm: Normal rate and regular rhythm.   Pulmonary:      Effort: Pulmonary effort is normal. No respiratory distress.      Breath sounds: Normal breath sounds.   Abdominal:      General: Bowel sounds are normal. There is no distension.      Palpations: Abdomen is soft.      Tenderness: There is no abdominal tenderness.   Musculoskeletal:      Right lower leg: No edema.      Left lower leg: No edema.      Comments: Right hip incision, SOHA, intact.    Skin:     General: Skin is warm and dry.   Neurological:      General: No focal deficit present.      Mental Status: She is alert.      Comments: Oriented times 1   Psychiatric:         Mood and Affect: Mood normal.         Behavior: Behavior normal.         Assessment/Plan   Problem List  Items Addressed This Visit       Atrial fibrillation (CMS/[facility])     Not normally on ac,currently on lovenox post op at dvt ppx dose.   Rate controlled.           Closed intertrochanteric fracture of right femur with routine healing - Primary     S/p IM nailing 7/25.  She is now transferring with 1 person assist and she is planning on discharge back to [facility] tomorrow.    continue with therapy as able and will have C at [facility].   Follow up with orthopedics as scheduled.           Dementia (CMS/[facility])     She is oriented to person.           Primary hypertension     Not currently on meds, continue to monitor and initiate meds based on clinical course.          Urinary tract infection associated with indwelling urethral catheter (CMS/[facility])     Limon was removed yesterday and she has been voiding well per staff.   Atb compelted for uti. Staff to monitor and notify for any recurrence of symptoms.          labs/meds/orders reviewed  staff to monitor and notify for any changes.  continue with therapy as able, 50% weight bearing to RLE  Follow up with orthopedics as scheduled.  was not removed as ordered.   She is weak and frail.    Acceptable for discharge with [facility] back to [facility] with 24/7 care.  Requires use of assistive device for ambulation.  Folllow up with PCP 1-2 weeks.  Scripts left in anticipation of discharge.  Time for coordination of care was greater than 35 minutes [facility] chart review, visit and exam, discussion with nursing, therapy and ss staff.   Wheelchair script provided.

## 2023-08-17 NOTE — LETTER
Patient: Myrna Judd  : 1929    Encounter Date: 2023    Subjective  Myrna Judd is a 89 y.o. female Here for discharge planning.   HPI    Health problems reviewed.    Participating in therapy as able, limited by weiight bearing restructions and cognitive deficits.   Eating and drinking fair per staff.  calorie and fluid count was ordered, no results available.   Denies any complaints of pain.  She completed course of augmentin for klebsiella uti.  Limon was removed yesterday morning and per staff she is voiding without difficulty.   No concerns per staff.        Review of Systems   Constitutional:  Negative for chills, fatigue and fever.   Respiratory:  Negative for cough and shortness of breath.    Cardiovascular:  Negative for chest pain and palpitations.   Gastrointestinal:  Negative for abdominal pain, constipation, diarrhea, nausea and vomiting.   Genitourinary:  Negative for difficulty urinating.       Objective  /77   Pulse 78   Temp 36.7 °C (98.1 °F)   Resp 18   SpO2 97%     Physical Exam  Constitutional:       General: She is not in acute distress.     Appearance: Normal appearance.   HENT:      Head: Normocephalic and atraumatic.   Eyes:      Conjunctiva/sclera: Conjunctivae normal.   Cardiovascular:      Rate and Rhythm: Normal rate and regular rhythm.   Pulmonary:      Effort: Pulmonary effort is normal. No respiratory distress.      Breath sounds: Normal breath sounds.   Abdominal:      General: Bowel sounds are normal. There is no distension.      Palpations: Abdomen is soft.      Tenderness: There is no abdominal tenderness.   Musculoskeletal:      Right lower leg: No edema.      Left lower leg: No edema.      Comments: Right hip incision, SOHA, intact.    Skin:     General: Skin is warm and dry.   Neurological:      General: No focal deficit present.      Mental Status: She is alert.      Comments: Oriented times 1   Psychiatric:         Mood and Affect: Mood normal.          Behavior: Behavior normal.         Assessment/Plan  Problem List Items Addressed This Visit       Atrial fibrillation (CMS/HCC)     Not normally on ac,currently on lovenox post op at dvt ppx dose.   Rate controlled.           Closed intertrochanteric fracture of right femur with routine healing - Primary     S/p IM nailing 7/25.  She is now transferring with 1 person assist and she is planning on discharge back to [facility] tomorrow.    continue with therapy as able and will have [facility] at [facility].   Follow up with orthopedics as scheduled.           Dementia (CMS/HCC)     She is oriented to person.           Primary hypertension     Not currently on meds, continue to monitor and initiate meds based on clinical course.          Urinary tract infection associated with indwelling urethral catheter (CMS/HCC)     Limon was removed yesterday and she has been voiding well per staff.   Atb compelted for uti. Staff to monitor and notify for any recurrence of symptoms.          labs/meds/orders reviewed  staff to monitor and notify for any changes.  continue with therapy as able, 50% weight bearing to RLE  Follow up with orthopedics as scheduled.  was not removed as ordered.   She is weak and frail.    Acceptable for discharge with [facility] back to [facility] with 24/7 care.  Requires use of assistive device for ambulation.  Folllow up with PCP 1-2 weeks.  Scripts left in anticipation of discharge.  Time for coordination of care was greater than 35 minutes [facility] chart review, visit and exam, discussion with nursing, therapy and ss staff.   Wheelchair script provided.           Electronically Signed By: GO Thomason   8/18/23  8:37 PM

## 2023-08-18 ASSESSMENT — ENCOUNTER SYMPTOMS
FATIGUE: 0
VOMITING: 0
FEVER: 0
DIFFICULTY URINATING: 0
ABDOMINAL PAIN: 0
SHORTNESS OF BREATH: 0
DIARRHEA: 0
CHILLS: 0
NAUSEA: 0
CONSTIPATION: 0
PALPITATIONS: 0
COUGH: 0

## 2023-08-19 NOTE — ASSESSMENT & PLAN NOTE
S/p IM nailing 7/25.  She is now transferring with 1 person assist and she is planning on discharge back to [facility] tomorrow.    continue with therapy as able and will have C at [facility].   Follow up with orthopedics as scheduled.

## 2023-08-19 NOTE — ASSESSMENT & PLAN NOTE
Limon was removed yesterday and she has been voiding well per staff.   Atb compelted for uti. Staff to monitor and notify for any recurrence of symptoms.

## 2023-08-20 ASSESSMENT — ENCOUNTER SYMPTOMS
FEVER: 0
VOMITING: 0
ABDOMINAL PAIN: 0
CONSTIPATION: 0
DIFFICULTY URINATING: 0
CHILLS: 0
PALPITATIONS: 0
SHORTNESS OF BREATH: 0
DIARRHEA: 0
FATIGUE: 0
COUGH: 0
NAUSEA: 0

## 2023-08-21 DIAGNOSIS — S72.141D CLOSED DISPLACED INTERTROCHANTERIC FRACTURE OF RIGHT FEMUR WITH ROUTINE HEALING, SUBSEQUENT ENCOUNTER: Primary | ICD-10-CM

## 2023-08-21 RX ORDER — TRAMADOL HYDROCHLORIDE 50 MG/1
50 TABLET ORAL EVERY 6 HOURS PRN
Qty: 40 TABLET | Refills: 0 | Status: SHIPPED | OUTPATIENT
Start: 2023-08-21 | End: 2023-08-31

## 2023-10-12 ENCOUNTER — APPOINTMENT (OUTPATIENT)
Dept: ORTHOPEDIC SURGERY | Facility: CLINIC | Age: 88
End: 2023-10-12
Payer: MEDICARE